# Patient Record
Sex: FEMALE | Race: WHITE | NOT HISPANIC OR LATINO | Employment: UNEMPLOYED | URBAN - METROPOLITAN AREA
[De-identification: names, ages, dates, MRNs, and addresses within clinical notes are randomized per-mention and may not be internally consistent; named-entity substitution may affect disease eponyms.]

---

## 2018-09-02 ENCOUNTER — HOSPITAL ENCOUNTER (EMERGENCY)
Facility: CLINIC | Age: 68
Discharge: HOME | End: 2018-09-02
Attending: EMERGENCY MEDICINE | Admitting: INTERNAL MEDICINE
Payer: MEDICARE

## 2018-09-02 VITALS
DIASTOLIC BLOOD PRESSURE: 56 MMHG | HEART RATE: 67 BPM | WEIGHT: 147 LBS | SYSTOLIC BLOOD PRESSURE: 110 MMHG | TEMPERATURE: 98 F | OXYGEN SATURATION: 98 % | BODY MASS INDEX: 23.63 KG/M2 | HEIGHT: 66 IN | RESPIRATION RATE: 16 BRPM

## 2018-09-02 DIAGNOSIS — T18.108A FOREIGN BODY IN ESOPHAGUS, INITIAL ENCOUNTER: ICD-10-CM

## 2018-09-02 LAB — UPPER GI ENDOSCOPY: NORMAL

## 2018-09-02 PROCEDURE — A9270 NON-COVERED ITEM OR SERVICE: HCPCS | Mod: GY | Performed by: EMERGENCY MEDICINE

## 2018-09-02 PROCEDURE — 25500045 ZZH RX 255: Performed by: EMERGENCY MEDICINE

## 2018-09-02 PROCEDURE — 99285 EMERGENCY DEPT VISIT HI MDM: CPT | Mod: 25

## 2018-09-02 PROCEDURE — 96374 THER/PROPH/DIAG INJ IV PUSH: CPT

## 2018-09-02 PROCEDURE — 25000128 H RX IP 250 OP 636: Performed by: EMERGENCY MEDICINE

## 2018-09-02 PROCEDURE — 25000128 H RX IP 250 OP 636: Performed by: INTERNAL MEDICINE

## 2018-09-02 PROCEDURE — 43247 EGD REMOVE FOREIGN BODY: CPT | Performed by: INTERNAL MEDICINE

## 2018-09-02 PROCEDURE — 25000132 ZZH RX MED GY IP 250 OP 250 PS 637: Performed by: EMERGENCY MEDICINE

## 2018-09-02 PROCEDURE — 25000128 H RX IP 250 OP 636

## 2018-09-02 PROCEDURE — 96375 TX/PRO/DX INJ NEW DRUG ADDON: CPT

## 2018-09-02 RX ORDER — ONDANSETRON 4 MG/1
4 TABLET, ORALLY DISINTEGRATING ORAL EVERY 6 HOURS PRN
Status: CANCELLED | OUTPATIENT
Start: 2018-09-02

## 2018-09-02 RX ORDER — NITROGLYCERIN 0.4 MG/1
0.4 TABLET SUBLINGUAL ONCE
Status: COMPLETED | OUTPATIENT
Start: 2018-09-02 | End: 2018-09-02

## 2018-09-02 RX ORDER — FENTANYL CITRATE 50 UG/ML
INJECTION, SOLUTION INTRAMUSCULAR; INTRAVENOUS
Status: COMPLETED
Start: 2018-09-02 | End: 2018-09-02

## 2018-09-02 RX ORDER — NALOXONE HYDROCHLORIDE 0.4 MG/ML
.1-.4 INJECTION, SOLUTION INTRAMUSCULAR; INTRAVENOUS; SUBCUTANEOUS
Status: CANCELLED | OUTPATIENT
Start: 2018-09-02 | End: 2018-09-03

## 2018-09-02 RX ORDER — FENTANYL CITRATE 50 UG/ML
50 INJECTION, SOLUTION INTRAMUSCULAR; INTRAVENOUS
Status: DISCONTINUED | OUTPATIENT
Start: 2018-09-02 | End: 2018-09-03 | Stop reason: HOSPADM

## 2018-09-02 RX ORDER — ONDANSETRON 2 MG/ML
4 INJECTION INTRAMUSCULAR; INTRAVENOUS
Status: CANCELLED | OUTPATIENT
Start: 2018-09-02

## 2018-09-02 RX ORDER — FLUMAZENIL 0.1 MG/ML
0.2 INJECTION, SOLUTION INTRAVENOUS
Status: CANCELLED | OUTPATIENT
Start: 2018-09-02 | End: 2018-09-03

## 2018-09-02 RX ORDER — ONDANSETRON 2 MG/ML
4 INJECTION INTRAMUSCULAR; INTRAVENOUS EVERY 6 HOURS PRN
Status: CANCELLED | OUTPATIENT
Start: 2018-09-02

## 2018-09-02 RX ORDER — LIDOCAINE 40 MG/G
CREAM TOPICAL
Status: CANCELLED | OUTPATIENT
Start: 2018-09-02

## 2018-09-02 RX ADMIN — NITROGLYCERIN 0.4 MG: 0.4 TABLET SUBLINGUAL at 19:31

## 2018-09-02 RX ADMIN — FENTANYL CITRATE 50 MCG: 50 INJECTION INTRAMUSCULAR; INTRAVENOUS at 20:54

## 2018-09-02 RX ADMIN — MIDAZOLAM HYDROCHLORIDE 1 MG: 1 INJECTION, SOLUTION INTRAMUSCULAR; INTRAVENOUS at 20:54

## 2018-09-02 RX ADMIN — MIDAZOLAM HYDROCHLORIDE 1 MG: 1 INJECTION, SOLUTION INTRAMUSCULAR; INTRAVENOUS at 21:03

## 2018-09-02 RX ADMIN — ANTACID/ANTIFLATULENT 4 G: 380; 550; 10; 10 GRANULE, EFFERVESCENT ORAL at 19:42

## 2018-09-02 RX ADMIN — MIDAZOLAM HYDROCHLORIDE 1 MG: 1 INJECTION, SOLUTION INTRAMUSCULAR; INTRAVENOUS at 20:56

## 2018-09-02 RX ADMIN — FENTANYL CITRATE 50 MCG: 50 INJECTION INTRAMUSCULAR; INTRAVENOUS at 21:00

## 2018-09-02 RX ADMIN — GLUCAGON HYDROCHLORIDE 1 MG: 1 INJECTION, POWDER, FOR SOLUTION INTRAMUSCULAR; INTRAVENOUS; SUBCUTANEOUS at 19:25

## 2018-09-02 ASSESSMENT — ENCOUNTER SYMPTOMS
SHORTNESS OF BREATH: 0
TROUBLE SWALLOWING: 1
CHOKING: 0

## 2018-09-02 NOTE — ED AVS SNAPSHOT
Emergency Department    [address] [place] [address]    Phone:  [phone]    Fax:  [phone]                                       Prudence Cross   MRN: [id number]    Department:   Emergency Department   Date of Visit:  9/2/2018           After Visit Summary Signature Page     I have received my discharge instructions, and my questions have been answered. I have discussed any challenges I see with this plan with the nurse or doctor.    ..........................................................................................................................................  Patient/Patient Representative Signature      ..........................................................................................................................................  Patient Representative Print Name and Relationship to Patient    ..................................................               ................................................  Date                                            Time    ..........................................................................................................................................  Reviewed by Signature/Title    ...................................................              ..............................................  Date                                                            Time          22EPIC Rev 08/18

## 2018-09-02 NOTE — ED AVS SNAPSHOT
Emergency Department    [address] [facility] [address]    Phone:  [phone]    Fax:  [phone]                                       Prudence Cross   MRN: [id number]    Department:   Emergency Department   Date of Visit:  9/2/2018           Patient Information     Date Of Birth          1950        Your diagnoses for this visit were:     Foreign body in esophagus, initial encounter        You were seen by Trierweiler, Chad A, MD.      Follow-up Information     Follow up with Shelley Myles MD.    Specialty:  Gastroenterology    Why:  as scheduled    Contact information:    [facility]  [address]  [facility] [id number] [phone]          Follow up with  Emergency Department.    Specialty:  EMERGENCY MEDICINE    Why:  If symptoms worsen    Contact information:    [address] [facility] [id number] [phone]        Discharge Instructions         Esophageal Blockage, Resolved  The esophagus is the passage that carries food from the mouth to the stomach. You had a blockage in the esophagus. This can happen after swallowing a large piece of food, taking a large pill, or swallowing foreign objects.  If this is a recurring problem, it can be a sign of disease in the esophagus, such as inflammation (swelling and irritation) or scarring. If you did not have a special procedure (endoscopy) today to treat your condition, further testing will be needed to evaluate this problem.  The blockage has cleared. You should be able to swallow normally again.  Home care    For the next 24 hours you may drink liquids and eat soft foods.    You may have been given medicine today to prevent pain and help you relax. If so, you may feel drowsy for the next 4 to 12 hours. Do not drive or operate dangerous equipment until you feel alert again.    If your esophagus was blocked by food, be sure to cut solid food into small pieces before putting it into  your mouth. Chew all foods well before swallowing.    If your esophagus was blocked by an over-the-counter pill (such as a vitamin), avoid this size pill in the future. If it was blocked by a prescription medicine, ask your healthcare provider for another form of medicine.  Follow-up care  Follow up with your healthcare provider, or as advised. If you continue to have problems, contact your doctor or this facility for advice. If this is a recurring problem, talk with your healthcare provider about it. He or she may suggest having an endoscopy. This is a look in the esophagus with a small camera and light in a narrow, flexible tube.  When to seek medical advice  Call your healthcare provider right away if any of these occur:    Unable to swallow    Significant pain on swallowing    Fever of 100.4 F (38 C) or higher, or as directed by your healthcare provider  Call 911  Call 911 if any of the following occur:     Chest pain or shortness of breath    Vomiting blood (red or black)    Blood in your stool (dark red or black color)   Date Last Reviewed: 5/1/2017 2000-2017 [employer]. [address] [place] [address]. All rights reserved. This information is not intended as a substitute for professional medical care. Always follow your healthcare professional's instructions.          24 Hour Appointment Hotline       To make an appointment at any [facility], call [phone] ([phone]). If you don't have a family doctor or clinic, we will help you find one. [facility] clinics are conveniently located to serve the needs of you and your family.             Review of your medicines      Our records show that you are taking the medicines listed below. If these are incorrect, please call your family doctor or clinic.        Dose / Directions Last dose taken    PRILOSEC PO        Take  by mouth.   Refills:  0                Procedures and tests performed during your visit     UPPER GI  ENDOSCOPY      Orders Needing Specimen Collection     None      Pending Results     No orders found from 8/31/2018 to 9/3/2018.            Pending Culture Results     No orders found from 8/31/2018 to 9/3/2018.            Pending Results Instructions     If you had any lab results that were not finalized at the time of your Discharge, you can call the ED Lab Result RN at [phone]. You will be contacted by this team for any positive Lab results or changes in treatment. The nurses are available 7 days a week from 10A to 6:30P.  You can leave a message 24 hours per day and they will return your call.        Test Results From Your Hospital Stay               Clinical Quality Measure: Blood Pressure Screening     Your blood pressure was checked while you were in the emergency department today. The last reading we obtained was  BP: 104/62 . Please read the guidelines below about what these numbers mean and what you should do about them.  If your systolic blood pressure (the top number) is less than 120 and your diastolic blood pressure (the bottom number) is less than 80, then your blood pressure is normal. There is nothing more that you need to do about it.  If your systolic blood pressure (the top number) is 120-139 or your diastolic blood pressure (the bottom number) is 80-89, your blood pressure may be higher than it should be. You should have your blood pressure rechecked within a year by a primary care provider.  If your systolic blood pressure (the top number) is 140 or greater or your diastolic blood pressure (the bottom number) is 90 or greater, you may have high blood pressure. High blood pressure is treatable, but if left untreated over time it can put you at risk for heart attack, stroke, or kidney failure. You should have your blood pressure rechecked by a primary care provider within the next 4 weeks.  If your provider in the emergency department today gave you specific instructions to follow-up with  "your doctor or provider even sooner than that, you should follow that instruction and not wait for up to 4 weeks for your follow-up visit.        Thank you for choosing [facility]       Thank you for choosing [facility] for your care. Our goal is always to provide you with excellent care. Hearing back from our patients is one way we can continue to improve our services. Please take a few minutes to complete the written survey that you may receive in the mail after you visit with us. Thank you!        [email or web address] Information     [employer] lets you send messages to your doctor, view your test results, renew your prescriptions, schedule appointments and more. To sign up, go to [email or web address] . Click on \"Log in\" on the left side of the screen, which will take you to the Welcome page. Then click on \"Sign up Now\" on the right side of the page.     You will be asked to enter the access code listed below, as well as some personal information. Please follow the directions to create your username and password.     Your access code is: [email or web address]  Expires: 2018 10:38 PM     Your access code will  in 90 days. If you need help or a new code, please call your [facility] clinic or [phone].        Care EveryWhere ID     This is your Care EveryWhere ID. This could be used by other organizations to access your [facility] medical records  [id number]        Equal Access to Services     HALEY QUIGLEY : Hadvin Fair, waaxda leny, qaybta kaalveronica berkowitz. So [facility] [phone].    ATENCIÓN: Si habla español, tiene a hoover disposición servicios gratuitos de asistencia lingüística. Anna al [phone].    We comply with applicable federal civil rights laws and [place] laws. We do not discriminate on the basis of race, color, national origin, age, disability, sex, sexual orientation, or gender identity.            After Visit Summary       This is your record. " Keep this with you and show to your community pharmacist(s) and doctor(s) at your next visit.

## 2018-09-03 NOTE — DISCHARGE INSTRUCTIONS
Esophageal Blockage, Resolved  The esophagus is the passage that carries food from the mouth to the stomach. You had a blockage in the esophagus. This can happen after swallowing a large piece of food, taking a large pill, or swallowing foreign objects.  If this is a recurring problem, it can be a sign of disease in the esophagus, such as inflammation (swelling and irritation) or scarring. If you did not have a special procedure (endoscopy) today to treat your condition, further testing will be needed to evaluate this problem.  The blockage has cleared. You should be able to swallow normally again.  Home care    For the next 24 hours you may drink liquids and eat soft foods.    You may have been given medicine today to prevent pain and help you relax. If so, you may feel drowsy for the next 4 to 12 hours. Do not drive or operate dangerous equipment until you feel alert again.    If your esophagus was blocked by food, be sure to cut solid food into small pieces before putting it into your mouth. Chew all foods well before swallowing.    If your esophagus was blocked by an over-the-counter pill (such as a vitamin), avoid this size pill in the future. If it was blocked by a prescription medicine, ask your healthcare provider for another form of medicine.  Follow-up care  Follow up with your healthcare provider, or as advised. If you continue to have problems, contact your doctor or this facility for advice. If this is a recurring problem, talk with your healthcare provider about it. He or she may suggest having an endoscopy. This is a look in the esophagus with a small camera and light in a narrow, flexible tube.  When to seek medical advice  Call your healthcare provider right away if any of these occur:    Unable to swallow    Significant pain on swallowing    Fever of 100.4 F (38 C) or higher, or as directed by your healthcare provider  Call 911  Call 911 if any of the following occur:     Chest pain or shortness  of breath    Vomiting blood (red or black)    Blood in your stool (dark red or black color)   Date Last Reviewed: 5/1/2017 2000-2017 [employer]. [address] [place] [address], [place] [address]. All rights reserved. This information is not intended as a substitute for professional medical care. Always follow your healthcare professional's instructions.

## 2018-09-03 NOTE — PROCEDURES
PRE-PROCEDURE H&P    CHIEF COMPLAINT / REASON FOR PROCEDURE:  Food impaction    PERTINENT HISTORY :    Past Medical History:   Diagnosis Date     Gastro-oesophageal reflux disease       Past Surgical History:   Procedure Laterality Date     APPENDECTOMY       ENT SURGERY           Bleeding tendencies:  No    Relevant Family History:  NONE     Relevant Social History:  NONE      A relevant review of systems was performed and was negative        ALLERGIES/SENSITIVITIES:   Allergies   Allergen Reactions     Penicillins        CURRENT MEDICATIONS:   Current Outpatient Prescriptions   Medication Sig Dispense Refill     Omeprazole (PRILOSEC PO) Take  by mouth.          PRE-SEDATION ASSESSMENT:    Lung Exam:  normal  Heart Exam:  normal  Airway Exam: normal  Previous reaction to anesthesia/sedation:   No  Sedation plan based on assessment: Moderate (conscious) sedation  ASA Classification:  1 - Healthy patient, no medical problems      IMPRESSION:  Food impaction    PLAN:  EGD     Shelley Myles  [facility]  Office: [phone]

## 2018-09-03 NOTE — OR NURSING
EGD with foreign body removal in ED Stab rm 2 by Dr. Myles. Sedation and monitoring by ED RN. No specimens obtained.

## 2018-09-03 NOTE — ED PROVIDER NOTES
"  History     Chief Complaint:  Swallowed Foreign Body    HPI   Prudence Cross is a 68 year old female with a medical history of gastro-oesophageal reflux disease who presents after swallowing a foreign body. The patient was last seen here in the emergency department in 2017 for esophageal obstruction after eating chicken sara. An upper GI endoscopy was performed by Dr. Antony in the emergency department. Patient felt improved following the procedure. She returns to the emergency department this evening after going to the [facility] this afternoon and ate half of a pork chop about 6 hours ago. She suddenly felt like she was unable to swallow with had the sensation of her throat is \"swelling up.\" She also notes having flatulence with spitting. She feels like there is a lump in the back of her throat when she tries to swallow and water would come back out when she tries to drink water. She tried to apply ice to her throat without any improvement. She is not choking, shortness of breath or any other concerns at this time.     Allergies:  Penicillins     Medications:    Prilosec     Past Medical History:    Gastro-oesophageal reflux disease     Past Surgical History:    Appendectomy  ENT surgery     Family History:    History reviewed. No pertinent family history.      Social History:  Smoking status: Never smoker  Alcohol use: Yes    Marital Status:  Single [1]  PCP: Samia Thompson      Review of Systems   HENT: Positive for trouble swallowing.    Respiratory: Negative for choking and shortness of breath.    All other systems reviewed and are negative.    Physical Exam   Patient Vitals for the past 24 hrs:   BP Temp Temp src Pulse Heart Rate Resp SpO2 Height Weight   09/02/18 2204 104/62 - - - 80 - 95 % - -   09/02/18 2131 108/63 - - - 78 - 98 % - -   09/02/18 2125 111/58 - - - 79 20 92 % - -   09/02/18 2120 112/62 - - - 80 13 92 % - -   09/02/18 2115 118/62 - - - 82 12 98 % - -   09/02/18 2110 120/66 - - - " "82 18 94 % - -   09/02/18 2105 131/70 - - - 98 18 94 % - -   09/02/18 2100 138/79 - - - 92 10 96 % - -   09/02/18 2055 125/79 - - - 95 14 99 % - -   09/02/18 2050 110/56 - - - 88 (!) 37 98 % - -   09/02/18 2015 116/59 - - - 79 - - - -   09/02/18 2000 104/60 - - - 86 - - - -   09/02/18 1945 119/64 - - - 96 - - - -   09/02/18 1848 136/63 98  F (36.7  C) Temporal 91 91 16 98 % 1.676 m (5' 6\") 66.7 kg (147 lb)      Physical Exam  Eye:  Pupils are equal, round, and reactive.  Extraocular movements intact.    ENT:  No rhinorrhea.  Moist mucus membranes.  Normal tongue and tonsil.    Cardiac:  Regular rate and rhythm.  No murmurs, gallops, or rubs.    Pulmonary:  Clear to auscultation bilaterally.  No wheezes, rales, or rhonchi.    Abdomen:  Positive bowel sounds.  Abdomen is soft and non-distended, without focal tenderness. Patient is spitting up her secretions, unable to tolerate water.     Musculoskeletal:  Normal movement of all extremities without evidence for deficit.    Skin:  Warm and dry without rashes.    Neurologic:  Non-focal exam without asymmetric weakness or numbness.     Psychiatric:  Normal affect with appropriate interaction with examiner.     Emergency Department Course     Interventions:  1925: Glucagon 1 mg IV  1931: Nitrostat 0.4 mg sublingual  1942: EZ Gas 4 mg oral     Emergency Department Course:  Past medical records, nursing notes, and vitals reviewed.  0704: I performed an exam of the patient and obtained history, as documented above.    Patient was given the above interventions while here in the emergency department.   2009: I rechecked the patient. Patient is feeling improved.  1945: I discussed the case with Dr. Myles of gastroenterology, who will see the patient here in the emergency department and perform an EGD.  I rechecked the patient. Findings and plan explained to the Patient. Patient discharged home with instructions regarding supportive care, medications, and reasons to return. The " importance of close follow-up was reviewed.      Impression & Plan    Medical Decision Making:  This 68-year-old woman with a prior history of esophageal impaction returning to us 6 years later with a piece of pork chop lodged in her esophagus.  This occurred approximately 5-6 hours ago.  She has been unable to tolerate her secretions since.  Her exam is otherwise unremarkable.  She failed a trial of glucagon, nitroglycerin, and easy gas granules.  With this, I spoke with Dr. Myles of gastroenterology who agreed to come to the emergency department and perform an endoscopy, removing the impaction.  The patient will follow up with her in 2 weeks time to discuss ongoing treatments.  Otherwise, on my reassessment, the patient is now alert and cooperative and able to be discharged by cab.  She was advised to stick with a soft diet and to return to us for any return of her impaction or other emergent concerns.    Diagnosis:    ICD-10-CM   1. Foreign body in esophagus, initial encounter T18.108A     Disposition:  discharged to home    Carol Ann Paulino  9/2/2018    EMERGENCY DEPARTMENT  I, Carol Ann Paulino, am serving as a scribe at 7:04 PM on 9/2/2018 to document services personally performed by Trierweiler, Chad A, MD based on my observations and the provider's statements to me.       Trierweiler, Chad A, MD  09/03/18 2610

## 2020-06-01 ENCOUNTER — HOSPITAL ENCOUNTER (EMERGENCY)
Facility: CLINIC | Age: 70
Discharge: HOME | End: 2020-06-02
Attending: EMERGENCY MEDICINE | Admitting: EMERGENCY MEDICINE
Payer: COMMERCIAL

## 2020-06-01 ENCOUNTER — NURSE TRIAGE (OUTPATIENT)
Dept: NURSING | Facility: CLINIC | Age: 70
End: 2020-06-01

## 2020-06-01 DIAGNOSIS — T18.108A FOREIGN BODY IN ESOPHAGUS, INITIAL ENCOUNTER: ICD-10-CM

## 2020-06-01 PROCEDURE — 25000128 H RX IP 250 OP 636: Performed by: INTERNAL MEDICINE

## 2020-06-01 PROCEDURE — 96374 THER/PROPH/DIAG INJ IV PUSH: CPT | Mod: 59

## 2020-06-01 PROCEDURE — 96375 TX/PRO/DX INJ NEW DRUG ADDON: CPT

## 2020-06-01 PROCEDURE — 99285 EMERGENCY DEPT VISIT HI MDM: CPT | Mod: 25

## 2020-06-01 PROCEDURE — 25500045 ZZH RX 255: Performed by: EMERGENCY MEDICINE

## 2020-06-01 PROCEDURE — 43247 EGD REMOVE FOREIGN BODY: CPT | Performed by: INTERNAL MEDICINE

## 2020-06-01 RX ORDER — ONDANSETRON 2 MG/ML
4 INJECTION INTRAMUSCULAR; INTRAVENOUS
Status: CANCELLED | OUTPATIENT
Start: 2020-06-01

## 2020-06-01 RX ORDER — NALOXONE HYDROCHLORIDE 0.4 MG/ML
.1-.4 INJECTION, SOLUTION INTRAMUSCULAR; INTRAVENOUS; SUBCUTANEOUS
Status: DISCONTINUED | OUTPATIENT
Start: 2020-06-01 | End: 2020-06-02 | Stop reason: HOSPADM

## 2020-06-01 RX ORDER — FENTANYL CITRATE 50 UG/ML
25 INJECTION, SOLUTION INTRAMUSCULAR; INTRAVENOUS EVERY 5 MIN PRN
Status: DISCONTINUED | OUTPATIENT
Start: 2020-06-01 | End: 2020-06-02 | Stop reason: HOSPADM

## 2020-06-01 RX ORDER — FLUMAZENIL 0.1 MG/ML
0.2 INJECTION, SOLUTION INTRAVENOUS
Status: DISCONTINUED | OUTPATIENT
Start: 2020-06-01 | End: 2020-06-02 | Stop reason: HOSPADM

## 2020-06-01 RX ORDER — FENTANYL CITRATE 50 UG/ML
50 INJECTION, SOLUTION INTRAMUSCULAR; INTRAVENOUS
Status: COMPLETED | OUTPATIENT
Start: 2020-06-01 | End: 2020-06-01

## 2020-06-01 RX ADMIN — MIDAZOLAM HYDROCHLORIDE 2 MG: 1 INJECTION, SOLUTION INTRAMUSCULAR; INTRAVENOUS at 23:51

## 2020-06-01 RX ADMIN — ANTACID/ANTIFLATULENT 4 G: 380; 550; 10; 10 GRANULE, EFFERVESCENT ORAL at 21:07

## 2020-06-01 RX ADMIN — MIDAZOLAM HYDROCHLORIDE 1 MG: 1 INJECTION, SOLUTION INTRAMUSCULAR; INTRAVENOUS at 23:59

## 2020-06-01 RX ADMIN — FENTANYL CITRATE 50 MCG: 0.05 INJECTION, SOLUTION INTRAMUSCULAR; INTRAVENOUS at 23:58

## 2020-06-01 RX ADMIN — FENTANYL CITRATE 100 MCG: 0.05 INJECTION, SOLUTION INTRAMUSCULAR; INTRAVENOUS at 23:50

## 2020-06-01 NOTE — ED AVS SNAPSHOT
Emergency Department  [address] [facility] [address]  Phone:  [phone]  Fax:  [phone]                                    Prudence Cross   MRN: [id number]    Department:   Emergency Department   Date of Visit:  6/1/2020           After Visit Summary Signature Page    I have received my discharge instructions, and my questions have been answered. I have discussed any challenges I see with this plan with the nurse or doctor.    ..........................................................................................................................................  Patient/Patient Representative Signature      ..........................................................................................................................................  Patient Representative Print Name and Relationship to Patient    ..................................................               ................................................  Date                                   Time    ..........................................................................................................................................  Reviewed by Signature/Title    ...................................................              ..............................................  Date                                               Time          22EPIC Rev 08/18

## 2020-06-01 NOTE — TELEPHONE ENCOUNTER
"I choked on a piece of streak and it's still stuck\".      Although caller appears to understand directives to have another adult drive her in, she said she has no one who can do that.  She said \"I did this last time\".  I advised 911 several times, she is insisting on driving to ER now.    Due to nature of call and urgency, I did not ask travel questions.    Reason for Disposition    [1] Patient cleared the FB spontaneously BUT [2] continues to have coughing, hoarseness, or wheezing > 30 minutes    Additional Information    Negative: [1] Choking or struggling to breathe now AND [2] lasts > 60 seconds    Negative: Can't cough, speak, or make any noise now (i.e., stops breathing)    Negative: Has passed out or is limp.    Negative: Bluish (or gray) lips or face now    Negative: Sounds like a life-threatening emergency to the triager    Negative: [1] Recovered from choking AND [2] may have swallowed a FB (foreign body)    Protocols used: CHOKING - INHALED FOREIGN BODY-LOREE-    Bruna More RN  [facility]      "

## 2020-06-02 VITALS
RESPIRATION RATE: 11 BRPM | BODY MASS INDEX: 24.21 KG/M2 | TEMPERATURE: 98.3 F | OXYGEN SATURATION: 95 % | DIASTOLIC BLOOD PRESSURE: 65 MMHG | WEIGHT: 150 LBS | SYSTOLIC BLOOD PRESSURE: 117 MMHG | HEART RATE: 80 BPM

## 2020-06-02 LAB — UPPER GI ENDOSCOPY: NORMAL

## 2020-06-02 RX ORDER — ONDANSETRON 4 MG/1
4 TABLET, ORALLY DISINTEGRATING ORAL EVERY 6 HOURS PRN
Status: CANCELLED | OUTPATIENT
Start: 2020-06-02

## 2020-06-02 RX ORDER — ONDANSETRON 2 MG/ML
4 INJECTION INTRAMUSCULAR; INTRAVENOUS EVERY 6 HOURS PRN
Status: CANCELLED | OUTPATIENT
Start: 2020-06-02

## 2020-06-02 RX ORDER — FLUMAZENIL 0.1 MG/ML
0.2 INJECTION, SOLUTION INTRAVENOUS
Status: CANCELLED | OUTPATIENT
Start: 2020-06-02 | End: 2020-06-02

## 2020-06-02 RX ORDER — NALOXONE HYDROCHLORIDE 0.4 MG/ML
.1-.4 INJECTION, SOLUTION INTRAMUSCULAR; INTRAVENOUS; SUBCUTANEOUS
Status: CANCELLED | OUTPATIENT
Start: 2020-06-02 | End: 2020-06-03

## 2020-06-02 NOTE — ED TRIAGE NOTES
Patient states she was eating steak about 1800, states the steak is stuck 'not as bad as last time'. Patient tried to drink some pop after, wasn't able to. Patient states she can't swallow her saliva, spitting into an emesis bag.

## 2020-06-02 NOTE — PROGRESS NOTES
Pre-Endoscopy History and Physical     Prudence Cross MRN# [id number]   YOB: 1950 Age: 70 year old     Date of Procedure: 6/1/2020  Primary care provider: Samia Thompson  Type of Endoscopy: Esophagogastroduodenoscopy with possible biopsy, possible dilation, possible foreign body removal  Reason for Procedure: esophageal food impaction  Type of Anesthesia Anticipated: Conscious Sedation    HPI:    Prudence is a 70 year old female with eosinophilic esophagitis and mid-esophageal stricture (four previous EGDs over the last 10 years), maintained on daily PPI, who presents with esophageal food impaction (steak) at 1610 this evening. She will be undergoing the above procedure.      A history and physical has been performed. The patient's medications and allergies have been reviewed. The risks and benefits of the procedure and the sedation options and risks were discussed with the patient.  All questions were answered and informed consent was obtained.      She denies a personal or family history of anesthesia complications or bleeding disorders.     There is no problem list on file for this patient.       Past Medical History:   Diagnosis Date     Gastro-oesophageal reflux disease         Past Surgical History:   Procedure Laterality Date     APPENDECTOMY       ENT SURGERY       ESOPHAGOSCOPY, GASTROSCOPY, DUODENOSCOPY (EGD), COMBINED N/A 9/2/2018    Procedure: COMBINED ESOPHAGOSCOPY, GASTROSCOPY, DUODENOSCOPY (EGD), REMOVE FOREIGN BODY;;  Surgeon: Shelley Myles MD;  Location:  GI       Social History     Tobacco Use     Smoking status: Never Smoker   Substance Use Topics     Alcohol use: Yes       No family history on file.    Prior to Admission medications    Medication Sig Start Date End Date Taking? Authorizing Provider   Omeprazole (PRILOSEC PO) Take  by mouth.    Reported, Patient       Allergies   Allergen Reactions     Penicillins         REVIEW OF SYSTEMS:   5 point ROS negative  "except as noted above in HPI, including Gen., Resp., CV, GI &  system review.    PHYSICAL EXAM:   /79   Pulse 82   Temp 98.3  F (36.8  C) (Oral)   Resp 18   Wt 68 kg (150 lb)   SpO2 97%   BMI 24.21 kg/m   Estimated body mass index is 24.21 kg/m  as calculated from the following:    Height as of 9/2/18: 1.676 m (5' 6\").    Weight as of this encounter: 68 kg (150 lb).   GENERAL APPEARANCE: alert, and oriented  MENTAL STATUS: alert  AIRWAY EXAM: Mallampatti Class II (visualization of the soft palate, fauces, and uvula)  RESP: lungs clear to auscultation - no rales, rhonchi or wheezes  CV: regular rates and rhythm  DIAGNOSTICS:    Not indicated    IMPRESSION   ASA Class 2 - Mild systemic disease    PLAN:   Plan for Esophagogastroduodenoscopy with possible biopsy, possible dilation, possible foreign body removal. We discussed the risks, benefits and alternatives and the patient wished to proceed.    The above has been forwarded to the consulting provider.      Tom Hoyt MD  June 1, 2020            "

## 2020-06-02 NOTE — ED NOTES
Pt updated that she needs to become more awake before discharge. Pt given apple juice. Will monitor tolerance.

## 2020-06-02 NOTE — H&P
History     Chief Complaint:  Swallowed Foreign Body       HPI   Prudence Cross is a 70 year old female who presents with sensation of a foreign body impacted in her esophagus.  She had this before, she believes that stay, about 6 PM tonight she noticed that she had this impacted sensation, she is unable to pass a soda pop or saliva, no pain, no vomiting..    Allergies:  Penicillins     Medications:    Omeprazole (PRILOSEC PO)        Past Medical History:    Past Medical History:   Diagnosis Date     Gastro-oesophageal reflux disease        There are no active problems to display for this patient.       Past Surgical History:    Past Surgical History:   Procedure Laterality Date     APPENDECTOMY       ENT SURGERY       ESOPHAGOSCOPY, GASTROSCOPY, DUODENOSCOPY (EGD), COMBINED N/A 9/2/2018    Procedure: COMBINED ESOPHAGOSCOPY, GASTROSCOPY, DUODENOSCOPY (EGD), REMOVE FOREIGN BODY;;  Surgeon: Shelley Myles MD;  Location:  GI     ESOPHAGOSCOPY, GASTROSCOPY, DUODENOSCOPY (EGD), COMBINED N/A 6/1/2020    Procedure: ESOPHAGOGASTRODUODENOSCOPY, WITH FOREIGN BODY REMOVAL;  Surgeon: Tom Hoyt MD;  Location:  GI        Family History:    family history is not on file.    Social History:   reports that she has never smoked. She does not have any smokeless tobacco history on file. She reports current alcohol use. She reports that she does not use drugs.    PCP: Samia Thompson     Review of Systems   All other systems reviewed and are negative.        Physical Exam     Patient Vitals for the past 24 hrs:   BP Temp Temp src Pulse Heart Rate Resp SpO2 Weight   06/02/20 0200 117/65 -- -- -- -- -- -- --   06/02/20 0145 99/63 -- -- 80 -- -- 95 % --   06/02/20 0130 108/63 -- -- 71 -- -- 94 % --   06/02/20 0045 118/57 -- -- 76 -- -- -- --   06/02/20 0025 120/71 -- -- 73 74 11 97 % --   06/02/20 0020 118/64 -- -- 72 76 8 98 % --   06/02/20 0015 120/60 -- -- 78 75 12 97 % --   06/02/20 0010 127/67 -- -- 81  80 8 97 % --   06/02/20 0005 122/62 -- -- 77 80 11 93 % --   06/01/20 2355 (!) 143/79 -- -- 86 80 (!) 0 -- --   06/01/20 2350 (!) 145/64 -- -- 89 96 18 99 % --   06/01/20 2255 136/79 -- -- 82 -- -- 97 % --   06/01/20 1944 (!) 142/66 98.3  F (36.8  C) Oral 108 -- 18 97 % 68 kg (150 lb)   06/01/20 1939 (!) 142/66 98.3  F (36.8  C) Oral -- 108 -- 97 % --        Physical Exam  Vitals: reviewed by me  General: Pt seen on [facility], pleasant, cooperative, and alert to conversation.  Uncomfortable appearing, spitting into a bag.  Slightly anxious.  Eyes: Tracking well, clear conjunctiva BL  ENT: MMM, midline trachea.   Lungs:   No tachypnea, no accessory muscle use. No respiratory distress.   CV: Rate as above, regular rhythm.    MSK: no peripheral edema or joint effusion.  No evidence of trauma  Skin: No rash, normal turgor and temperature  Neuro: Clear speech and no facial droop.  Psych: Not RIS, no e/o /    Emergency Department Course     Medications   sod bicarbonate-citric acid-simethicone (EZ GAS) 2.21-1.53-0.04 g packet 4 g (4 g Oral Given 6/1/20 2107)   midazolam (VERSED) injection 1 mg (2 mg Intravenous Given 6/1/20 2351)   fentaNYL (PF) (SUBLIMAZE) injection 50 mcg (100 mcg Intravenous Given 6/1/20 2350)          Impression & Plan      Medical Decision Making:  This is a very pleasant 70-year-old female presents the emergency room with appears to be a foreign body in her esophagus.  She is a clear history of this, and has had it before, of course putting her at increased risk of having again.  We gave her easy gas, this did not work, she still has a sensation of a foreign body in her esophagus, and she still cannot pass even her saliva.  I do not think that she can go home, so I called Dr. Hoyt of [facility], who has come to the ER, and is setting up for endoscopy now.  Patient will be signed out to my colleague, Dr. Galicia.  I suspect strongly that she will be able to discharge home after  this, assuming she can tolerate liquids.  No other issues noted, will plan for GI intervention as above.    Diagnosis:    ICD-10-CM    1. Foreign body in esophagus, initial encounter  T18.108A         Discharge Medications:  Discharge Medication List as of 6/2/2020  2:06 AM           6/2/2020

## 2020-06-02 NOTE — ED NOTES
Pt ambulated to bathroom without difficulty. Pt able to tolerate po fluids without nausea. Pt awake and alert.

## 2024-02-28 ENCOUNTER — HOSPITAL ENCOUNTER (OUTPATIENT)
Dept: CT IMAGING | Facility: CLINIC | Age: 74
Discharge: HOME | End: 2024-02-28
Attending: OBSTETRICS & GYNECOLOGY | Admitting: OBSTETRICS & GYNECOLOGY
Payer: COMMERCIAL

## 2024-02-28 DIAGNOSIS — R10.2 PELVIC CRAMPING: ICD-10-CM

## 2024-02-28 PROCEDURE — 74176 CT ABD & PELVIS W/O CONTRAST: CPT

## 2024-06-26 ENCOUNTER — APPOINTMENT (OUTPATIENT)
Dept: CT IMAGING | Facility: CLINIC | Age: 74
End: 2024-06-26
Attending: STUDENT IN AN ORGANIZED HEALTH CARE EDUCATION/TRAINING PROGRAM
Payer: COMMERCIAL

## 2024-06-26 ENCOUNTER — HOSPITAL ENCOUNTER (EMERGENCY)
Facility: CLINIC | Age: 74
Discharge: HOME | End: 2024-06-27
Attending: STUDENT IN AN ORGANIZED HEALTH CARE EDUCATION/TRAINING PROGRAM | Admitting: STUDENT IN AN ORGANIZED HEALTH CARE EDUCATION/TRAINING PROGRAM
Payer: COMMERCIAL

## 2024-06-26 DIAGNOSIS — F32.A DEPRESSION, UNSPECIFIED DEPRESSION TYPE: ICD-10-CM

## 2024-06-26 LAB
ALBUMIN SERPL BCG-MCNC: 4.2 G/DL (ref 3.5–5.2)
ALBUMIN UR-MCNC: NEGATIVE MG/DL
ALP SERPL-CCNC: 99 U/L (ref 40–150)
ALT SERPL W P-5'-P-CCNC: 14 U/L (ref 0–50)
AMPHETAMINES UR QL SCN: NORMAL
ANION GAP SERPL CALCULATED.3IONS-SCNC: 10 MMOL/L (ref 7–15)
APPEARANCE UR: CLEAR
AST SERPL W P-5'-P-CCNC: 18 U/L (ref 0–45)
BARBITURATES UR QL SCN: NORMAL
BASOPHILS # BLD AUTO: 0.1 10E3/UL (ref 0–0.2)
BASOPHILS NFR BLD AUTO: 1 %
BENZODIAZ UR QL SCN: NORMAL
BILIRUB SERPL-MCNC: 0.2 MG/DL
BILIRUB UR QL STRIP: NEGATIVE
BUN SERPL-MCNC: 14.6 MG/DL (ref 8–23)
BZE UR QL SCN: NORMAL
CALCIUM SERPL-MCNC: 9.6 MG/DL (ref 8.8–10.2)
CANNABINOIDS UR QL SCN: NORMAL
CHLORIDE SERPL-SCNC: 100 MMOL/L (ref 98–107)
COLOR UR AUTO: ABNORMAL
CREAT SERPL-MCNC: 0.86 MG/DL (ref 0.51–0.95)
DEPRECATED HCO3 PLAS-SCNC: 27 MMOL/L (ref 22–29)
EGFRCR SERPLBLD CKD-EPI 2021: 71 ML/MIN/1.73M2
EOSINOPHIL # BLD AUTO: 0.5 10E3/UL (ref 0–0.7)
EOSINOPHIL NFR BLD AUTO: 5 %
ERYTHROCYTE [DISTWIDTH] IN BLOOD BY AUTOMATED COUNT: 13.2 % (ref 10–15)
FENTANYL UR QL: NORMAL
GLUCOSE SERPL-MCNC: 109 MG/DL (ref 70–99)
GLUCOSE UR STRIP-MCNC: NEGATIVE MG/DL
HCT VFR BLD AUTO: 39.6 % (ref 35–47)
HGB BLD-MCNC: 13.1 G/DL (ref 11.7–15.7)
HGB UR QL STRIP: NEGATIVE
HOLD SPECIMEN: NORMAL
IMM GRANULOCYTES # BLD: 0 10E3/UL
IMM GRANULOCYTES NFR BLD: 0 %
KETONES UR STRIP-MCNC: NEGATIVE MG/DL
LEUKOCYTE ESTERASE UR QL STRIP: NEGATIVE
LYMPHOCYTES # BLD AUTO: 2.6 10E3/UL (ref 0.8–5.3)
LYMPHOCYTES NFR BLD AUTO: 29 %
MCH RBC QN AUTO: 30 PG (ref 26.5–33)
MCHC RBC AUTO-ENTMCNC: 33.1 G/DL (ref 31.5–36.5)
MCV RBC AUTO: 91 FL (ref 78–100)
MONOCYTES # BLD AUTO: 0.7 10E3/UL (ref 0–1.3)
MONOCYTES NFR BLD AUTO: 8 %
MUCOUS THREADS #/AREA URNS LPF: PRESENT /LPF
NEUTROPHILS # BLD AUTO: 5.1 10E3/UL (ref 1.6–8.3)
NEUTROPHILS NFR BLD AUTO: 57 %
NITRATE UR QL: NEGATIVE
NRBC # BLD AUTO: 0 10E3/UL
NRBC BLD AUTO-RTO: 0 /100
OPIATES UR QL SCN: NORMAL
PCP QUAL URINE (ROCHE): NORMAL
PH UR STRIP: 6.5 [PH] (ref 5–7)
PLATELET # BLD AUTO: 336 10E3/UL (ref 150–450)
POTASSIUM SERPL-SCNC: 3.9 MMOL/L (ref 3.4–5.3)
PROT SERPL-MCNC: 7.2 G/DL (ref 6.4–8.3)
RBC # BLD AUTO: 4.37 10E6/UL (ref 3.8–5.2)
RBC URINE: 1 /HPF
SODIUM SERPL-SCNC: 137 MMOL/L (ref 135–145)
SP GR UR STRIP: 1.02 (ref 1–1.03)
SQUAMOUS EPITHELIAL: <1 /HPF
TSH SERPL DL<=0.005 MIU/L-ACNC: 3.35 UIU/ML (ref 0.3–4.2)
UROBILINOGEN UR STRIP-MCNC: NORMAL MG/DL
WBC # BLD AUTO: 9 10E3/UL (ref 4–11)
WBC URINE: 1 /HPF

## 2024-06-26 PROCEDURE — 99285 EMERGENCY DEPT VISIT HI MDM: CPT | Mod: 25

## 2024-06-26 PROCEDURE — 70450 CT HEAD/BRAIN W/O DYE: CPT

## 2024-06-26 PROCEDURE — 81001 URINALYSIS AUTO W/SCOPE: CPT | Mod: XU | Performed by: STUDENT IN AN ORGANIZED HEALTH CARE EDUCATION/TRAINING PROGRAM

## 2024-06-26 PROCEDURE — 80307 DRUG TEST PRSMV CHEM ANLYZR: CPT | Performed by: STUDENT IN AN ORGANIZED HEALTH CARE EDUCATION/TRAINING PROGRAM

## 2024-06-26 PROCEDURE — 84443 ASSAY THYROID STIM HORMONE: CPT | Performed by: STUDENT IN AN ORGANIZED HEALTH CARE EDUCATION/TRAINING PROGRAM

## 2024-06-26 PROCEDURE — 80053 COMPREHEN METABOLIC PANEL: CPT | Performed by: STUDENT IN AN ORGANIZED HEALTH CARE EDUCATION/TRAINING PROGRAM

## 2024-06-26 PROCEDURE — 85025 COMPLETE CBC W/AUTO DIFF WBC: CPT | Performed by: STUDENT IN AN ORGANIZED HEALTH CARE EDUCATION/TRAINING PROGRAM

## 2024-06-26 PROCEDURE — 36415 COLL VENOUS BLD VENIPUNCTURE: CPT | Performed by: STUDENT IN AN ORGANIZED HEALTH CARE EDUCATION/TRAINING PROGRAM

## 2024-06-26 PROCEDURE — 81001 URINALYSIS AUTO W/SCOPE: CPT | Performed by: EMERGENCY MEDICINE

## 2024-06-26 ASSESSMENT — COLUMBIA-SUICIDE SEVERITY RATING SCALE - C-SSRS
1. IN THE PAST MONTH, HAVE YOU WISHED YOU WERE DEAD OR WISHED YOU COULD GO TO SLEEP AND NOT WAKE UP?: NO
2. HAVE YOU ACTUALLY HAD ANY THOUGHTS OF KILLING YOURSELF IN THE PAST MONTH?: NO
6. HAVE YOU EVER DONE ANYTHING, STARTED TO DO ANYTHING, OR PREPARED TO DO ANYTHING TO END YOUR LIFE?: NO

## 2024-06-26 ASSESSMENT — ACTIVITIES OF DAILY LIVING (ADL)
ADLS_ACUITY_SCORE: 35

## 2024-06-27 ENCOUNTER — TELEPHONE (OUTPATIENT)
Dept: BEHAVIORAL HEALTH | Facility: CLINIC | Age: 74
End: 2024-06-27
Payer: COMMERCIAL

## 2024-06-27 VITALS
OXYGEN SATURATION: 98 % | HEART RATE: 61 BPM | TEMPERATURE: 98.2 F | SYSTOLIC BLOOD PRESSURE: 148 MMHG | DIASTOLIC BLOOD PRESSURE: 65 MMHG | RESPIRATION RATE: 18 BRPM

## 2024-06-27 PROBLEM — F43.22 ADJUSTMENT DISORDER WITH ANXIOUS MOOD: Status: ACTIVE | Noted: 2024-06-27

## 2024-06-27 ASSESSMENT — ACTIVITIES OF DAILY LIVING (ADL)
ADLS_ACUITY_SCORE: 35
ADLS_ACUITY_SCORE: 35
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
## 2024-06-27 NOTE — ED NOTES
"Pt up to restroom. Urine sample obtained and held. Pt c/o not eating today. Offered a courtesy meal. Pt refuses. Pt c/o back pain. Offered pain medication but declines stating \"I dont take medications.\" Pt then states \"I've been to nursing homes and all they do is push meds to make the pt's quiet.\"  "
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
## 2024-06-27 NOTE — ED NOTES
Patient resting in the cart. Alert and oriented x4, calm and cooperative. Reporting wanting to leave. Currently waiting for [facility] to complete workflow. Denies any needs. Currently on a BERKLEY. Sitter at bedside.

## 2024-06-27 NOTE — DISCHARGE INSTRUCTIONS
MENTAL HEALTH RESOURCES & SERVICES:   Behavioral Healthcare Providers Scheduling  During your hospitalization, you were seen by a licensed mental health professional through [facility] ([facility])  for a brief mental health assessment and/or psychotherapy at [facility].  It is recommended that you follow up with your established providers (psychiatrist, mental health therapist, and/or primary care doctor - as relevant) as soon as possible. Coordinators from [facility] will be calling you in the next 24-48 hours to ensure that you have the resources you need.  You can also contact [facility] coordinators directly at [phone].    - A referral for Short-term therapy counseling services has been placed. They will call you within 24 hours of your discharge. You can also call them at [phone]    - Call your insurance company (phone number on the back of your insurance card) to ask about medical appointment ride benefits - they may be able to cover the cost of transportation to and from medical/mental health appointments.         [facility] maintains an extensive network of licensed behavioral health providers to connect patients with the services they need.  We do not charge providers a fee to participate in our referral network.  We match patients with providers based on a patient s specific needs, insurance coverage, and location.  Our first effort will be to refer you to a provider within your care system, and will utilize providers outside your care system as needed.         WARMLINES:  The [place] Warmline provides a yddk-wh-famy approach to mental health recovery, support and wellness. Calls are answered by our team of professionally trained Certified Peer Specialists, who have first hand experience living with a mental health condition. Unlike a hotline or crisis line, Warmlines provide early intervention with emotional support that  "can prevent a crisis from escalating.  Open 7 Days/Week, 9am to 9pm.   Call 658-496-0594 (or 486-WARMLINE)  Text \"Support\" to 67059      -The Peer Support Connection Warmline (PSC) The Peer Support Connection Warmline of [place] is a safe and free way for individuals to receive confidential and anonymous one on one peer support from trained Peers, Certified Peer Support Specialists, and Recovery Coaches. Unlike a hotline or crisis line, Warmlines provide early intervention with emotional support that can prevent a crisis from escalating.-  Available from 5pm - 9am (7 days a week/365 days a year) 1-122.935.5072  For callers who want to specifically talk to an  peer:    Available on Tuesday or Thursday from 5pm-9pm, call [phone]    Visit www.[facility].DECA for resources/services for healthcare, , community resources, volunteer opportunities, housing options, home-care providers, state-funded programs.  Call the [place] Senior Linkage Line at 519-802-1669 for help with housing resources, health insurance, financial assistance, community supports, and legal assistance. You can also visit the website at [place]senior[place]linkage-line      ADDITIONAL SUPPORTS:  National [place] on Mental Illness of [place] ([facility]) provides support groups and educational programs. Visit www.namimn.org Helpline at 1-778.395.4801 or 943-930-8752 for further information.   [facility] ([place] on Mental Illness) improves the lives of children and adults with mental illnesses and their families by providing free classes on mental illnesses andsupport groups for adults with mental illnesses, parents and family members.   For more information:  Phone: 610.639.9651  Toll free: 3-480-PERD-HELPS  Website: www.namihelps.org       [facility]  Virtual or in-person, free, mental health therapy, no appointment needed  [phone]  [facility] [place] [address]       A " referral has been placed with the [facility] for brief, short-term solution focused therapy support with your mental health goals.   Call [phone] for more information or to schedule & see details here:        Get care started with an ongoing therapist by calling to schedule your intake at one of the following clinics:    [facility]: [phone]  [facility]  [phone]  [facility] [phone]  [facility] [phone]  [facility] [phone]  [facility] system  [phone]   [facility] [phone]  [facility] [phone]    Therapy providers accepting your insurance:  Call your insurance for plan specific coverage       Provider Name Location City Phone   Rachana Johnson  [facility]  [address] [place] [phone]   Ruth Merida PsyD   [facility]  [address] [place] [phone]   Rand Walter  [facility]  [address] [place] [phone]   Faina Galeano  [facility]  [address] [place] [phone]   Tanya Regan MA  [facility]  [address] [place] [phone]   Antonio Albrecht MA,MSW  [facility] Albrecht [facility]  [address] [place] [phone]   Ashvin Mobley  MSN  CNP,PMHNP,RN [facility]  [address] [place] [phone]   Benjamin Moore  PhD   [facility]  [address] [place] [phone]   Paulie Espinoza  PhD  [facility]  [address] [place] [phone]   Mei Hills  PhD  [facility]  [address], [address] [place] [phone]   Charis Fine MA  [facility]  [facility]  [address], [address] [place] [phone]   Tre Sol PsyD  LMFT,LP [facility]  [address], [address] [place] [phone]   Lulu Macias  MSW  LICSW [facility]  [address] [place] [address], [address] [place] [phone]   Radha Reina  PhD   [facility]  [address] [place] [address], [address] [place] [phone]   Shanice Butler  MSW  LICSW [facility]  [address] [place] [address], [address] [place] [phone]   Nik Tobin MA  [facility]  [address] [place] [address], [address] [place] [phone]   Faina Alvarez  MSW  LIC[facility]  [address] [place] [address], [address] [place] [phone]   Kiran Chavez  PhD   [facility]  [address], [address] [place] [phone]   Omar Solorio MS  CNP,RN [facility]  [address], [address] [place] [phone]   Tre Sol PsyD  LMFT, [facility]  [address], [address] [place] [phone]   Lulu Macias  MSW  [facility]  [address] [place] [phone]   Radha Reina  PhD   [facility]  [address], [address] [place] [phone]   Shanice Butler  MSW  [facility]  [address], [address] [place] [phone]

## 2024-06-27 NOTE — ED NOTES
Taxi ride set up for patient to her listed home address. Patient assisted to the ED entrance where she said she would wait for the ride. Patient appreciative of this intervention.

## 2024-06-27 NOTE — TELEPHONE ENCOUNTER
Number has been changed, disconnected, or is no longer in service.    Will reach out to referral source re: alternate contact.    Ruchi Moreira  Transition Clinic Coordinator  06/27/24 9:32 AM
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
## 2024-06-27 NOTE — CONSULTS
"Diagnostic Evaluation Consultation  Crisis Assessment    Patient Name: Prudence Cross  Age:  74 year old  Legal Sex: female  Gender Identity: female  Pronouns:   Race: White  Ethnicity: Not  or   Language: English      Patient was assessed: In person   Crisis Assessment Start Date: 06/27/24  Crisis Assessment Start Time: 0015  Crisis Assessment Stop Time: 0045  Patient location: [facility] [place] EMERGENCY DEPT                                 Referral Data and Chief Complaint  Prudence Cross presents to the ED via EMS. Patient is presenting to the ED for the following concerns: Suicidal ideation, Worsening psychosocial stress.   Factors that make the mental health crisis life threatening or complex are:  Prudence learned today that she was scammed most of her life savings, she was thinking she would be paid today from a \"sweepstakes\" she won 2nd place in, and that she would be paid $3.5 million dollars and a [employer]ov[employer] car. The patient reports this has been going on almost 2 years and she was hopeful today she would be paid money by the scammers. Today, patient called one of the [facility] Investigators, Doug Cordova, who told patient he was not on duty today and provided patient with an alternative number to call, and the patient replied with \"I won't need another phone number, I am just going to shoot myself\" so patient was brought in by police on [address] to ensure safety. Patient does not have access to a gun and in interview with writer, patient does not endorse plan or intent to kill herself. The patient was upset at being on a hold and in the ED, stating \"those comments were a figure of speech\". The patient has a pending eviction with an upcoming court date on 7/16/24 for failure to pay rent. Patient has an adult protection worker with [facility] and is involved with the [facility]'s office investigation into the scams. Patient is upset they are attempting to " qualify the patient for a conservSoundCurehip to manage her money. Patient does not endorse SI/HI/AH/VH/SIB. Patient was agreeable to a referral to the transition clinic for therapy.      Informed Consent and Assessment Methods  Explained the crisis assessment process, including applicable information disclosures and limits to confidentiality, assessed understanding of the process, and obtained consent to proceed with the assessment.  Assessment methods included conducting a formal interview with patient, review of medical records, collaboration with medical staff, and obtaining relevant collateral information from family and community providers when available.  : done     Patient response to interventions: acceptance expressed, needs reinforcement  Coping skills were attempted to reduce the crisis:  Trying to figure everything out on her own     History of the Crisis   Prudence does not have a significant hx for mental health. Per [facility], the patient has been dealing with scams since March 2023. Patient engaged in couples therapy when she was  and going through a divorce, but did not find it helpful. Patient is severely against medication.    Brief Psychosocial History  Family:  , Children no  Support System:   ([facility]; Cousin Charis)  Employment Status:  employment seeking, retired  Source of Income:  social security  Financial Environmental Concerns:  eviction pending, unable to afford rent/mortgage, money taken/used without patient permission  Current Hobbies:  writing/journaling/blogging, social media/computer activities  Barriers in Personal Life:  lack of companionship, financial concerns    Significant Clinical History  Current Anxiety Symptoms:  racing thoughts, excessive worry  Current Depression/Trauma:  sense of doom, negativistic, low self esteem, hopelessness, helplessness  Current Somatic Symptoms:  racing thoughts, excessive worry  Current Psychosis/Thought Disturbance:      Current Eating Symptoms:     Chemical Use History:  Alcohol: None  Benzodiazepines: None  Opiates: None  Cocaine: None  Marijuana: None  Other Use: None   Past diagnosis:  No known past diagnosis  Family history:  No known history of mental health or chemical health concerns  Past treatment:   (Couples counseling)  Details of most recent treatment:  None  Other relevant history:  Pt is working with a [facility] Adult Protection Worker       Collateral Information  Is there collateral information: Yes     Collateral information name, relationship, phone number:  Spoke with responding officer from [facility] [phone]    What happened today: Patient told Doug officer who was off duty that she was going to shoot herself. She has had reports about fraud since March 2023. She is well-known to us. There is a long hx of scams, evidence that she has lost close to half a million dollars, her entire life savings. Its probably someone overseas and [place]. About 2 months ago, I caught her packing for a trip to [facility] to  this agueda who was a part of the scam, I talked her out of going. But since then, she has fallen for 6 or 7 other scams. She is working with an adult protection worker, and the  who is trying to get her to qualify for a conservatorship. She doesn't qualify though. She is very gullible, she would believe you if you said the mando is green.     What is different about patient's functioning: Increasingly falling for scams.       Has patient made comments about wanting to kill themselves/others: yes    If d/c is recommended, can they take part in safety/aftercare planning:  no       Risk Assessment  Marathon Suicide Severity Rating Scale Full Clinical Version:  Suicidal Ideation  Q6 Suicide Behavior (Lifetime): no          Marathon Suicide Severity Rating Scale Recent:   Suicidal Ideation (Recent)  Q1 Wished to be Dead (Past Month): no  Q2  Suicidal Thoughts (Past Month): no  Level of Risk per Screen: no risks indicated          Environmental or Psychosocial Events: legal issues such as DWI, DUI, lawsuit, CPS involvement, etc., geographic isolation from supports, challenging interpersonal relationships, work or task failure, unstable housing, homelessness, excessive debt, poor finances, social isolation  Protective Factors: Protective Factors: sense of importance of health and wellness, help seeking, sense of self-efficacy and/or positive self-esteem, cultural, spiritual , or Nondenominational beliefs associated with meaning and value in life, optimistic outlook - identification of future goals    Does the patient have thoughts of harming others? Feels Like Hurting Others: no  Previous Attempt to Hurt Others: no  Is the patient engaging in sexually inappropriate behavior?: no    Is the patient engaging in sexually inappropriate behavior?  no        Mental Status Exam   Affect: Dramatic, Labile  Appearance: Appropriate  Attention Span/Concentration: Attentive  Eye Contact: Engaged    Fund of Knowledge: Delayed   Language /Speech Content: Fluent  Language /Speech Volume: Normal  Language /Speech Rate/Productions: Pressured  Recent Memory: Variable  Remote Memory: Variable  Mood: Anxious, Irritable  Orientation to Person: Yes   Orientation to Place: Yes  Orientation to Time of Day: Yes  Orientation to Date: Yes     Situation (Do they understand why they are here?): Yes  Psychomotor Behavior: Normal  Thought Content: Clear  Thought Form: Obsessive/Perseverative, Tangential       Medication  Psychotropic medications:   Medication Orders - Psychiatric (From admission, onward)      None             Current Care Team  Patient Care Team:  Samia Thompson MD as PCP - General (OB/Gyn)    Diagnosis  Patient Active Problem List   Diagnosis Code    Adjustment disorder with anxious mood F43.22       Primary Problem This Admission  Active Hospital Problems    *Adjustment  disorder with anxious mood        Clinical Summary and Substantiation of Recommendations   After therapeutic assessment, intervention and aftercare planning by ED care team and LM and in consultation with attending provider, the patient's circumstances and mental state were appropriate for outpatient management. It is the recommendation of this clinician that pt discharge with OP MH support. A this time the pt is not presenting as an acute risk to self or others due to the following factors: Patient does not endorse any acute mental health concerns. Patient is needing long-term supports for psychosocial services in the community, which are already established however patient has not been wanting to work with them. Patient was agreeable to accepting therapy for support, and additional resources ([facility], therapy resources, MH crisis support) were added to patient AVS.        Patient coping skills attempted to reduce the crisis:  Trying to figure everything out on her own    Disposition  Recommended disposition: Individual Therapy        Reviewed case and recommendations with attending provider. Attending Name: Dr. London Corado       Attending concurs with disposition: yes       Patient and/or validated legal guardian concurs with disposition: yes       Final disposition:  discharge    Legal status on admission: Voluntary/Patient has signed consent for treatment    Assessment Details   Total duration spent with the patient: 30 min     CPT code(s) utilized: 42862 - Psychotherapy for Crisis - 60 (30-74*) min    Rick Staples Psychotherapist  [facility] - Triage & Transition Services  Callback: [phone]

## 2024-06-28 ENCOUNTER — TELEPHONE (OUTPATIENT)
Dept: BEHAVIORAL HEALTH | Facility: CLINIC | Age: 74
End: 2024-06-28
Payer: COMMERCIAL

## 2024-06-28 NOTE — TELEPHONE ENCOUNTER
Second attempt to reach patient regarding Transition Clinic Referral.  Unable to leave message. Phone number is not in service.   Marking referral as complete. Will inform referral source that no appts were scheduled due to no contact with patient. Tracker form completed.    Hayley Yuan  Transition Clinic Coordinator  06/28/24 12:22 PM        ----- Message from Rick RALPH sent at 6/27/2024  2:24 AM CDT -----  Regarding: Referral for therapy  Transition Clinic Referral   [place]       Please Check Type of Referral Requested:       __XX__THERAPY: The Transition clinic is able to schedule patients without current medical insurance; these patient will be referred to our Social Work Care Coordinator for Medical Insurance              Assistance. We are open for referral for psychotherapy. Patient is referred from: [facility]      ____MEDICATION:   Referrals for Medication are ONLY accepted from the following areas (select): no                                      Suboxone and Opioid Management Referrals are automatically denied.   TC Psychiatry cannot see patient without active medical insurance.   Next level of psychiatry care must be within 12 months.  TC Psychiatry cannot accept patient with next level of care scheduled with PCP.  The transition clinic cannot follow patients who are on a restricted recipient program.    __ Long-Acting Injection (HENNESSY)? no        Referring Provider Contact Name: Rick Staples RAMONITA; Phone Number: [phone]    Reason for Transition Clinic Referral: Patient with extensive psychosocial stressors right now and minimal supports and patient  wants solution focused therapy    Next Level of Care Patient Will Be Transitioned To: Therapy  Provider(s) [facility] [employer]    Location [address] [place] [address], [address] [place] [address] [phone]  Date/Time TBD, patient was given list from provider database to call     What Would Be Helpful from the Transition Clinic: Patient  needs help accepting help, making a plan for the future and how she will get money to live, processing what has happened to her (fraud/scams taking all her life's savings), talking about what skills she has she can use now, and learning to not trust everyone she meets      Needs:NO    Does Patient Have Access to Technology: y    Patient E-mail Address: No e-mail address on record    Current Patient Phone Number: [phone];     Clinician Gender Preference (if applicable): NO    Patient location preference:In person    Rick Staples    (Master Form: Updated 11/28/2023)

## 2025-06-03 ENCOUNTER — PATIENT OUTREACH (OUTPATIENT)
Dept: CARE COORDINATION | Facility: CLINIC | Age: 75
End: 2025-06-03
Payer: COMMERCIAL